# Patient Record
Sex: MALE | Race: WHITE | HISPANIC OR LATINO | Employment: FULL TIME | URBAN - METROPOLITAN AREA
[De-identification: names, ages, dates, MRNs, and addresses within clinical notes are randomized per-mention and may not be internally consistent; named-entity substitution may affect disease eponyms.]

---

## 2018-01-04 ENCOUNTER — HOSPITAL ENCOUNTER (EMERGENCY)
Facility: HOSPITAL | Age: 48
Discharge: HOME | End: 2018-01-04
Attending: EMERGENCY MEDICINE

## 2018-01-04 VITALS
RESPIRATION RATE: 18 BRPM | HEART RATE: 70 BPM | DIASTOLIC BLOOD PRESSURE: 96 MMHG | HEIGHT: 65 IN | TEMPERATURE: 97 F | BODY MASS INDEX: 28.32 KG/M2 | WEIGHT: 170 LBS | OXYGEN SATURATION: 98 % | SYSTOLIC BLOOD PRESSURE: 139 MMHG

## 2018-01-04 DIAGNOSIS — R05.9 COUGH: ICD-10-CM

## 2018-01-04 DIAGNOSIS — B34.9 VIRAL SYNDROME: Primary | ICD-10-CM

## 2018-01-04 LAB
ALBUMIN SERPL BCP-MCNC: 4.5 G/DL
ALP SERPL-CCNC: 87 U/L
ALT SERPL W/O P-5'-P-CCNC: 51 U/L
ANION GAP SERPL CALC-SCNC: 14 MMOL/L
AST SERPL-CCNC: 41 U/L
BASOPHILS # BLD AUTO: 0.01 K/UL
BASOPHILS NFR BLD: 0.1 %
BILIRUB SERPL-MCNC: 0.4 MG/DL
BILIRUB UR QL STRIP: NEGATIVE
BUN SERPL-MCNC: 12 MG/DL
CALCIUM SERPL-MCNC: 9.9 MG/DL
CHLORIDE SERPL-SCNC: 100 MMOL/L
CLARITY UR: CLEAR
CO2 SERPL-SCNC: 27 MMOL/L
COLOR UR: YELLOW
CREAT SERPL-MCNC: 1 MG/DL
DIFFERENTIAL METHOD: ABNORMAL
EOSINOPHIL # BLD AUTO: 0 K/UL
EOSINOPHIL NFR BLD: 0.4 %
ERYTHROCYTE [DISTWIDTH] IN BLOOD BY AUTOMATED COUNT: 12.7 %
EST. GFR  (AFRICAN AMERICAN): >60 ML/MIN/1.73 M^2
EST. GFR  (NON AFRICAN AMERICAN): >60 ML/MIN/1.73 M^2
FLUAV AG SPEC QL IA: NEGATIVE
FLUBV AG SPEC QL IA: NEGATIVE
GLUCOSE SERPL-MCNC: 122 MG/DL
GLUCOSE UR QL STRIP: NEGATIVE
HCT VFR BLD AUTO: 48.6 %
HGB BLD-MCNC: 16.9 G/DL
HGB UR QL STRIP: NEGATIVE
KETONES UR QL STRIP: NEGATIVE
LEUKOCYTE ESTERASE UR QL STRIP: NEGATIVE
LIPASE SERPL-CCNC: 24 U/L
LYMPHOCYTES # BLD AUTO: 1.1 K/UL
LYMPHOCYTES NFR BLD: 12.8 %
MCH RBC QN AUTO: 30.3 PG
MCHC RBC AUTO-ENTMCNC: 34.8 G/DL
MCV RBC AUTO: 87 FL
MONOCYTES # BLD AUTO: 0.5 K/UL
MONOCYTES NFR BLD: 5.4 %
NEUTROPHILS # BLD AUTO: 7.2 K/UL
NEUTROPHILS NFR BLD: 81.1 %
NITRITE UR QL STRIP: NEGATIVE
PH UR STRIP: 6 [PH] (ref 5–8)
PLATELET # BLD AUTO: 264 K/UL
PMV BLD AUTO: 9.4 FL
POTASSIUM SERPL-SCNC: 4 MMOL/L
PROT SERPL-MCNC: 9.3 G/DL
PROT UR QL STRIP: NEGATIVE
RBC # BLD AUTO: 5.58 M/UL
SODIUM SERPL-SCNC: 141 MMOL/L
SP GR UR STRIP: >=1.03 (ref 1–1.03)
SPECIMEN SOURCE: NORMAL
URN SPEC COLLECT METH UR: ABNORMAL
UROBILINOGEN UR STRIP-ACNC: NEGATIVE EU/DL
WBC # BLD AUTO: 8.92 K/UL

## 2018-01-04 PROCEDURE — 85025 COMPLETE CBC W/AUTO DIFF WBC: CPT

## 2018-01-04 PROCEDURE — 63600175 PHARM REV CODE 636 W HCPCS: Performed by: PHYSICIAN ASSISTANT

## 2018-01-04 PROCEDURE — 80053 COMPREHEN METABOLIC PANEL: CPT

## 2018-01-04 PROCEDURE — 83690 ASSAY OF LIPASE: CPT

## 2018-01-04 PROCEDURE — 99284 EMERGENCY DEPT VISIT MOD MDM: CPT | Mod: 25

## 2018-01-04 PROCEDURE — 87400 INFLUENZA A/B EACH AG IA: CPT

## 2018-01-04 PROCEDURE — 96374 THER/PROPH/DIAG INJ IV PUSH: CPT

## 2018-01-04 PROCEDURE — 81003 URINALYSIS AUTO W/O SCOPE: CPT

## 2018-01-04 PROCEDURE — 25000003 PHARM REV CODE 250: Performed by: EMERGENCY MEDICINE

## 2018-01-04 PROCEDURE — 25000003 PHARM REV CODE 250: Performed by: PHYSICIAN ASSISTANT

## 2018-01-04 PROCEDURE — 96361 HYDRATE IV INFUSION ADD-ON: CPT

## 2018-01-04 RX ORDER — GUAIFENESIN 600 MG/1
1200 TABLET, EXTENDED RELEASE ORAL 2 TIMES DAILY
COMMUNITY

## 2018-01-04 RX ORDER — ONDANSETRON 2 MG/ML
4 INJECTION INTRAMUSCULAR; INTRAVENOUS
Status: COMPLETED | OUTPATIENT
Start: 2018-01-04 | End: 2018-01-04

## 2018-01-04 RX ORDER — ACETAMINOPHEN 325 MG/1
325 TABLET ORAL EVERY 6 HOURS PRN
COMMUNITY

## 2018-01-04 RX ORDER — ONDANSETRON 4 MG/1
4 TABLET, ORALLY DISINTEGRATING ORAL EVERY 8 HOURS PRN
Qty: 15 TABLET | Refills: 0 | Status: SHIPPED | OUTPATIENT
Start: 2018-01-04

## 2018-01-04 RX ORDER — ONDANSETRON 4 MG/1
4 TABLET, ORALLY DISINTEGRATING ORAL
Status: COMPLETED | OUTPATIENT
Start: 2018-01-04 | End: 2018-01-04

## 2018-01-04 RX ADMIN — ONDANSETRON 4 MG: 2 INJECTION INTRAMUSCULAR; INTRAVENOUS at 04:01

## 2018-01-04 RX ADMIN — ONDANSETRON 4 MG: 4 TABLET, ORALLY DISINTEGRATING ORAL at 02:01

## 2018-01-04 RX ADMIN — SODIUM CHLORIDE 1000 ML: 0.9 INJECTION, SOLUTION INTRAVENOUS at 04:01

## 2018-01-04 NOTE — ED PROVIDER NOTES
Encounter Date: 1/4/2018       History     Chief Complaint   Patient presents with    Emesis     C/o nausea, vomiting, and generalized abdominal pain. Also c/o cough and congestion and dizziness for the past few days     Segundo Saldivar 47 y.o. afebrile male with  No reported PMH presented to the ED with c/o general illness for the past three days.  He c/o some congestion, cough, body aches, nausea and emesis. He states today he began with some generalized abdominal pain that occurs with vomiting episodes. He states multiple episodes of nonbloody/nonbilious emesis and began with some lightheadedness today that occurs with positional changes. He denies any fever, chills, headache, neck pain, neck rigidity, CP, SOB, or rash. He has not tried any medications for the symptoms. He denies any recent travel, recetn ABX use, suspected ABX use.      The history is provided by the patient.     Review of patient's allergies indicates:  No Known Allergies  History reviewed. No pertinent past medical history.  History reviewed. No pertinent surgical history.  No family history on file.  Social History   Substance Use Topics    Smoking status: Never Smoker    Smokeless tobacco: Not on file    Alcohol use No     Review of Systems   Constitutional: Positive for appetite change and chills. Negative for fever.   HENT: Positive for congestion. Negative for sore throat.    Eyes: Negative for visual disturbance.   Respiratory: Positive for cough (dry). Negative for shortness of breath.    Cardiovascular: Negative for chest pain.   Gastrointestinal: Positive for nausea and vomiting. Negative for abdominal pain, constipation and diarrhea.   Genitourinary: Negative for dysuria and hematuria.   Musculoskeletal: Negative for arthralgias, back pain, myalgias, neck pain and neck stiffness.   Skin: Negative for rash.   Neurological: Positive for light-headedness (with postional changes). Negative for dizziness, weakness and  headaches.   Hematological: Does not bruise/bleed easily.       Physical Exam     Initial Vitals [01/04/18 1422]   BP Pulse Resp Temp SpO2   (!) 159/105 90 20 98.1 °F (36.7 °C) 96 %      MAP       123         Physical Exam    Nursing note and vitals reviewed.  Constitutional: Vital signs are normal. He appears well-developed and well-nourished. He is cooperative.  Non-toxic appearance. He does not appear ill.   HENT:   Head: Normocephalic and atraumatic.   Nose: Mucosal edema present.   Mouth/Throat: Mucous membranes are dry. No posterior oropharyngeal edema or posterior oropharyngeal erythema.   Eyes: Conjunctivae and lids are normal.   Neck: Normal range of motion. Neck supple. No neck rigidity.   Cardiovascular: Normal rate and regular rhythm.   Pulmonary/Chest: Breath sounds normal. No respiratory distress. He has no wheezes. He has no rhonchi.   Abdominal: Soft. Normal appearance. There is no tenderness. There is no rebound and no guarding.   Musculoskeletal: Normal range of motion.   Neurological: He is alert and oriented to person, place, and time. GCS eye subscore is 4. GCS verbal subscore is 5. GCS motor subscore is 6.   Skin: Skin is warm, dry and intact. No rash noted.   Psychiatric: He has a normal mood and affect. His speech is normal and behavior is normal. Thought content normal.         ED Course   Procedures  Labs Reviewed   CBC W/ AUTO DIFFERENTIAL   COMPREHENSIVE METABOLIC PANEL   LIPASE   URINALYSIS   INFLUENZA A AND B ANTIGEN     Imaging Results          X-Ray Chest PA And Lateral (Final result)  Result time 01/04/18 16:21:00    Final result by Asha Ash MD (01/04/18 16:21:00)                 Impression:        No radiographic acute intrathoracic process seen.      Electronically signed by: ASHA ASH MD, MD  Date:     01/04/18  Time:    16:21              Narrative:    COMPARISON: None    FINDINGS: PA and lateral views of the chest. There is mild nonspecific elevation of the right  hemidiaphragm.   Pulmonary vasculature and hilar regions are within normal limits.  The bilateral lungs are otherwise well expanded and clear.  No pleural effusion or pneumothorax.  The heart and mediastinal contours are within normal limits for age.  Included osseous structures appear intact.                            Sgeundo Saldivar 47 y.o. afebrile male with  No reported PMH presented to the ED with c/o general illness for the past three days.  He c/o some congestion, cough, body aches, nausea and emesis. He states today he began with some generalized abdominal pain that occurs with vomiting episodes. He states multiple episodes of nonbloody/nonbilious emesis and began with some lightheadedness today that occurs with positional changes. He denies any fever, chills, headache, neck pain, neck rigidity, CP, SOB, or rash. He has not tried any medications for the symptoms. He denies any recent travel, recetn ABX use, suspected ABX use. ROS positive for general illness symptoms.  Physical exam reveals patient that appears ill but nontoxic. TM's clear; nose with congestion and edema. Oropharynx with mild erythema; no edema or exudate. Lungs clear and free of wheeze. Heart regular rate and rhythm. Abdomen is soft and nontender with normal bowel sounds; no CVA tenderness. FROM of neck, no lymphadenopathy and FROM of all extremities with strength 5/5 bilaterally. Skin free of rash, pallor and diaphoresis.    DDX: influenza, viral URI with cough, bronchitis, pneumonia, gastritis, pancreatitis, UTI    ED management: Flu swab negative. Labs and CXR with no significant abnormal findings.  Patient with moderate improvement in symptoms with fluids and Zofran in the ED. Patient remains afebrile and in no distress at this time.  We will send home with supportive medications for probable viral syndrome in this otherwise well patient and informed patient that this process is typically self limiting. Instructed patient on  fever and symptom control.      Impression/Plan: The primary encounter diagnosis was Viral syndrome. A diagnosis of Cough was also pertinent to this visit. Discharged with zofran. Patient will follow up with Primary.  Patient cautioned on when to return to ED.  Pt. Understands and agrees with current treatment plan                                                   ED Course      Clinical Impression:   The primary encounter diagnosis was Viral syndrome. A diagnosis of Cough was also pertinent to this visit.                           FREDA Pagan  01/05/18 [id number]

## 2018-01-04 NOTE — ED NOTES
Since December 12/31/17-c/o flu s/s and nausea and then today started vomiting. Pt c/o ,malaise and head spinning.